# Patient Record
(demographics unavailable — no encounter records)

---

## 2024-10-08 NOTE — HISTORY OF PRESENT ILLNESS
[de-identified] : cough/sore throat [FreeTextEntry6] : Presents with c/o cough/congestion x few days -- middle of the night last night barky cough.  c/o sore throat, feels some discomfort with cough.  NO fever.  Meds given: Motrin overnight. steam.  Appetite/activity at baseline, drinking well, good UO.  No vomiting/No diarrhea.   + school/sick contacts/baseball.

## 2024-10-08 NOTE — HISTORY OF PRESENT ILLNESS
[de-identified] : cough/sore throat [FreeTextEntry6] : Presents with c/o cough/congestion x few days -- middle of the night last night barky cough.  c/o sore throat, feels some discomfort with cough.  NO fever.  Meds given: Motrin overnight. steam.  Appetite/activity at baseline, drinking well, good UO.  No vomiting/No diarrhea.   + school/sick contacts/baseball.

## 2024-10-08 NOTE — PHYSICAL EXAM
[Pale Nasal Mucosa] : pale nasal mucosa [Erythematous Oropharynx] : erythematous oropharynx [Palate petechiae] : palate petechiae [NL] : warm, clear [Lethargic] : not lethargic [Clear Rhinorrhea] : clear rhinorrhea [Inflamed Nasal Mucosa] : inflamed nasal mucosa

## 2024-10-08 NOTE — DISCUSSION/SUMMARY
[FreeTextEntry1] :  9 year y/o male with mild croup, acute URI/pharyngitis - no signs of respiratory distress on exam.  Likely viral - no indication for antibiotic use at this time.   Rapid strep neg, TCX sent will follow up with results.  Supportive care reviewed -- Zyrtec/Flonase as directed, Nasal saline PRN, humidifier Good hydration discussed & good hand hygiene reviewed  If fever develops/persists > 48hr return for re-eval. Recommend using cool mist from a humidifier. Allow the child to breathe cool air during the night by opening a window or door.  Fever can be treated with an over-the-counter medication such as acetaminophen or ibuprofen.  Coughing can be treated with warm, clear fluids to loosen mucus on the vocal cords.  Keep the child's head elevated.  May use cetirizine qHS prn congestion/runny nose.  If any stridor at rest or worsening cough go to ED or contact health care provider immediately. d/w parent when steroids are indicated - if cough worsens will start as directed x 3d.  If worsens will return for re-eval.  RED FLAGS REVIEWED - indications for ED eval discussed, signs of respiratory distress/dehydration reviewed - parent agrees with plan, demonstrates an understanding, is able to repeat back instructions and has no questions at this time.   Encouraged good hydration and normal activity & diet.  Return sooner PRN.  Well care as scheduled.

## 2024-11-13 NOTE — PHYSICAL EXAM
[Clear Rhinorrhea] : clear rhinorrhea [Erythematous Oropharynx] : erythematous oropharynx [Clear to Auscultation Bilaterally] : clear to auscultation bilaterally [NL] : warm, clear [de-identified] : + PND

## 2024-11-13 NOTE — DISCUSSION/SUMMARY
[FreeTextEntry1] :  10 yo M w/ mycoplasma on RVP.  Abx as prescribed. Can trial albuterol as well q4h PRN. CXR orderd if no improvement on abx. Continue supportive care measures. RED FLAGS REVIEWED- discussed s/s of distress/ dehydration, discussed indications for going to ED for eval.  Parent expressed understanding and was able to verbalize back instructions/advice.  Parent to call/ return to office with patient for any concerns/ worsening symptoms. Recheck in 1 week.

## 2024-11-13 NOTE — HISTORY OF PRESENT ILLNESS
[de-identified] : Cough [FreeTextEntry6] : >1 week ago w/ dry/ persistent cough No fever Decreased energy Last night taken to [facility] [place]- Pulse ox 96%--> given albuterol and dexamethasone Pulse ox overnight when asleep was 91% Family hx of asthma Cough improved

## 2024-11-13 NOTE — DISCUSSION/SUMMARY
[FreeTextEntry1] :  8 yo M w/ mycoplasma on RVP.  Abx as prescribed. Can trial albuterol as well q4h PRN. CXR orderd if no improvement on abx. Continue supportive care measures. RED FLAGS REVIEWED- discussed s/s of distress/ dehydration, discussed indications for going to ED for eval.  Parent expressed understanding and was able to verbalize back instructions/advice.  Parent to call/ return to office with patient for any concerns/ worsening symptoms. Recheck in 1 week.

## 2024-11-13 NOTE — PHYSICAL EXAM
[Clear Rhinorrhea] : clear rhinorrhea [Erythematous Oropharynx] : erythematous oropharynx [Clear to Auscultation Bilaterally] : clear to auscultation bilaterally [NL] : warm, clear [de-identified] : + PND

## 2024-11-13 NOTE — PHYSICAL EXAM
[Clear Rhinorrhea] : clear rhinorrhea [Erythematous Oropharynx] : erythematous oropharynx [Clear to Auscultation Bilaterally] : clear to auscultation bilaterally [NL] : warm, clear [de-identified] : + PND

## 2024-11-13 NOTE — HISTORY OF PRESENT ILLNESS
[de-identified] : Cough [FreeTextEntry6] : >1 week ago w/ dry/ persistent cough No fever Decreased energy Last night taken to [facility]- Pulse ox 96%--> given albuterol and dexamethasone Pulse ox overnight when asleep was 91% Family hx of asthma Cough improved

## 2024-11-15 NOTE — HISTORY OF PRESENT ILLNESS
[de-identified] : recheck cough  [FreeTextEntry6] : He went to [facility]- Pulse ox 96% with cough/shortness of breath- given albuterol and dexamethasone (11/6).  He was seen for recheck few days ago & is here today to recheck lungs after completion of Zpack.  O2 sat 97-98% at night which improved from few days ago where his sat was 91-92%.  Patient states he is feeling better. Patient and mom state that cough improved, mild congestion. NO fever.  NO c/o pain at this time.  Meds given: Albuterol PRN which helps. Completed Zithromax 5d course. No other meds at this time.  Appetite/activity back to baseline, drinking well, good UO.  No vomiting/No diarrhea.   + school/sick contacts.

## 2024-11-15 NOTE — PHYSICAL EXAM
[NL] : warm, clear [Toxic] : not toxic [Wheezing] : no wheezing [Subcostal Retractions] : no subcostal retractions [Suprasternal Retractions] : no suprasternal retractions [FreeTextEntry4] : mild congestion.

## 2024-11-15 NOTE — DISCUSSION/SUMMARY
[FreeTextEntry1] :  9 year old male seen today for follow up after completion of antibiotics for atypical pneumonia.  Did well with antibx - lung exam clear - improved O2 sat.  Supportive care reviewed -- to start Zyrtec/Flonase qHS PRN, Nasal saline PRN, cool mist humidifier, steam up bathroom.   Good hydration discussed & good hand hygiene reviewed  If fever recurs or condition worsens return for re-eval. RED FLAGS REVIEWED - indications for ED eval discussed, signs of distress/dehydration reviewed - Mom agrees with plan, demonstrates an understanding, is able to repeat back instructions and has no questions at this time.   AAP 5210 reviewed -- once feeling better may resume normal activity & diet.  Return sooner PRN.  Well care as scheduled.

## 2024-11-15 NOTE — HISTORY OF PRESENT ILLNESS
[de-identified] : recheck cough  [FreeTextEntry6] : He went to [facility] [place]- Pulse ox 96% with cough/shortness of breath- given albuterol and dexamethasone (11/6).  He was seen for recheck few days ago & is here today to recheck lungs after completion of Zpack.  O2 sat 97-98% at night which improved from few days ago where his sat was 91-92%.  Patient states he is feeling better. Patient and mom state that cough improved, mild congestion. NO fever.  NO c/o pain at this time.  Meds given: Albuterol PRN which helps. Completed Zithromax 5d course. No other meds at this time.  Appetite/activity back to baseline, drinking well, good UO.  No vomiting/No diarrhea.   + school/sick contacts.

## 2024-12-11 NOTE — DISCUSSION/SUMMARY
[FreeTextEntry1] :  9 year old male with PND, clear lungs.  d/w mom no indication for antibiotic use at this time.   Supportive care reviewed -- to start Zyrtec and Flonase qHS PRN, Nasal saline PRN, cool mist humidifier, steam up bathroom.  REVIEWED indications/doses/uses for Albuterol use.  Good hydration discussed & good hand hygiene reviewed  If fever develops > 48hr or condition worsens return for re-eval. RED FLAGS REVIEWED - indications for ED eval discussed, signs of distress/dehydration reviewed - Mom agrees with plan, demonstrates an understanding, is able to repeat back instructions and has no questions at this time.   AAP 5210 reviewed -- once feeling better may resume normal activity & diet.  Return sooner PRN.  Well care as scheduled.

## 2024-12-11 NOTE — PHYSICAL EXAM
[Clear Rhinorrhea] : clear rhinorrhea [Wheezing] : no wheezing [Subcostal Retractions] : no subcostal retractions [Suprasternal Retractions] : no suprasternal retractions [NL] : warm, clear [FreeTextEntry4] : +congestion [de-identified] : +PND

## 2024-12-11 NOTE — HISTORY OF PRESENT ILLNESS
[de-identified] : congestion  [FreeTextEntry6] : Presents with c/o cough/congestion x few days. feeling better from recent illness of atypical pneumonia which was treated with Zithromax.  Mom wanted to get him checked before [place] goes [place].  Patient states he feels well.  NO fever.  Meds given: Albuterol inhaler PRN.  Appetite/activity at baseline, drinking well, good UO.  No vomiting/No diarrhea.   + school/sick contacts. Mom sick.

## 2024-12-11 NOTE — HISTORY OF PRESENT ILLNESS
[de-identified] : congestion  [FreeTextEntry6] : Presents with c/o cough/congestion x few days. feeling better from recent illness of atypical pneumonia which was treated with Zithromax.  Mom wanted to get him checked before [place] goes [place].  Patient states he feels well.  NO fever.  Meds given: Albuterol inhaler PRN.  Appetite/activity at baseline, drinking well, good UO.  No vomiting/No diarrhea.   + school/sick contacts. Mom sick.

## 2025-01-30 NOTE — HISTORY OF PRESENT ILLNESS
[EENT/Resp Symptoms] : EENT/RESPIRATORY SYMPTOMS [Runny nose] : runny nose [Nasal congestion] : nasal congestion [Chest congestion] : chest congestion [Cough] : cough [___ Day(s)] : [unfilled] day(s) [Constant] : constant [Fatigued] : fatigued [Sick Contacts: ___] : sick contacts: [unfilled] [Clear rhinorrhea] : clear rhinorrhea [Wet cough] : wet cough [Acetaminophen] : acetaminophen [Max Temp: ____] : Max temperature: [unfilled]

## 2025-01-30 NOTE — REVIEW OF SYSTEMS
[Malaise] : malaise [Headache] : headache [Nasal Discharge] : nasal discharge [Nasal Congestion] : nasal congestion [Cough] : cough [Myalgia] : myalgia [Negative] : Genitourinary

## 2025-03-19 NOTE — HISTORY OF PRESENT ILLNESS
[de-identified] : cough  [FreeTextEntry6] : Presents with c/o cough/congestion x 2 days, felt wheezy this morning.  Felt warm but NO fever.  Denies sore throat/ear pain.  Meds given: OTC cough med. Sunday night took inhaler but not overnight/today.  Appetite/activity at baseline, drinking well, good UO.  No vomiting/No diarrhea.   + school/sick contacts.

## 2025-03-19 NOTE — HISTORY OF PRESENT ILLNESS
[de-identified] : cough  [FreeTextEntry6] : Presents with c/o cough/congestion x 2 days, felt wheezy this morning.  Felt warm but NO fever.  Denies sore throat/ear pain.  Meds given: OTC cough med. Sunday night took inhaler but not overnight/today.  Appetite/activity at baseline, drinking well, good UO.  No vomiting/No diarrhea.   + school/sick contacts. Doxycycline Counseling:  Patient counseled regarding possible photosensitivity and increased risk for sunburn.  Patient instructed to avoid sunlight, if possible.  When exposed to sunlight, patients should wear protective clothing, sunglasses, and sunscreen.  The patient was instructed to call the office immediately if the following severe adverse effects occur:  hearing changes, easy bruising/bleeding, severe headache, or vision changes.  The patient verbalized understanding of the proper use and possible adverse effects of doxycycline.  All of the patient's questions and concerns were addressed.

## 2025-03-19 NOTE — PHYSICAL EXAM
Problem: SAFETY ADULT  Goal: Patient will remain free of falls  Description: INTERVENTIONS:  - Educate patient/family on patient safety including physical limitations  - Instruct patient to call for assistance with activity   - Consult OT/PT to assist with strengthening/mobility   - Keep Call bell within reach  - Keep bed low and locked with side rails adjusted as appropriate  - Keep care items and personal belongings within reach  - Initiate and maintain comfort rounds  - Make Fall Risk Sign visible to staff  - Offer Toileting every 4 Hours, in advance of nee  - Apply yellow socks and bracelet for high fall risk patients  - Consider moving patient to room near nurses station  Outcome: Progressing      [Clear Rhinorrhea] : clear rhinorrhea [NL] : warm, clear [Toxic] : not toxic [Stridor] : no stridor [Wheezing] : wheezing [Crackles] : no crackles

## 2025-03-19 NOTE — PHYSICAL EXAM
[Clear Rhinorrhea] : clear rhinorrhea [NL] : warm, clear [Toxic] : not toxic [Stridor] : no stridor [Wheezing] : wheezing [Crackles] : no crackles

## 2025-03-19 NOTE — DISCUSSION/SUMMARY
[FreeTextEntry1] :  10 year old male with acute URI with wheezing - h/o mild intermittent asthma - likely viral - no indication for antibiotic use at this time.   Declines RVP at this time - IF S/S WORSEN OR FEVER WILL RETURN FOR SWAB.  Supportive care reviewed -- may use Fluticasone PRN, Zyrtec or claritin qHS PRN, Nasal saline PRN, cool mist humidifier, steam up bathroom.   Reviewed indication/dosing/proper techniques for Albuterol use.  Good hydration discussed & good hand hygiene reviewed  If fever develops > 48hr or condition worsens return for re-eval. RED FLAGS REVIEWED - indications for ED eval discussed, signs of distress/dehydration reviewed - parent demonstrates an understanding, is able to repeat back instructions and has no questions at this time.   AAP 5210 reviewed -- once feeling better may resume normal activity & diet.  Cardio 5/9/25.  Return sooner PRN.  Well care as scheduled.

## 2025-06-19 NOTE — PHYSICAL EXAM
[Alert] : alert [No Acute Distress] : no acute distress [Normocephalic] : normocephalic [Conjunctivae with no discharge] : conjunctivae with no discharge [EOMI Bilateral] : EOMI bilateral [Auricles Well Formed] : auricles well formed [Clear Tympanic membranes with present light reflex and bony landmarks] : clear tympanic membranes with present light reflex and bony landmarks [No Discharge] : no discharge [Nares Patent] : nares patent [Palate Intact] : palate intact [Nonerythematous Oropharynx] : nonerythematous oropharynx [Supple, full passive range of motion] : supple, full passive range of motion [No Palpable Masses] : no palpable masses [Symmetric Chest Rise] : symmetric chest rise [Clear to Auscultation Bilaterally] : clear to auscultation bilaterally [Regular Rate and Rhythm] : regular rate and rhythm [Normal S1, S2 present] : normal S1, S2 present [No Murmurs] : no murmurs [+2 Femoral Pulses] : +2 femoral pulses [Soft] : soft [NonTender] : non tender [Non Distended] : non distended [Normoactive Bowel Sounds] : normoactive bowel sounds [No Hepatomegaly] : no hepatomegaly [No Splenomegaly] : no splenomegaly [Sahil: _____] : Sahil [unfilled] [Testicles Descended Bilaterally] : testicles descended bilaterally [Patent] : patent [No Abnormal Lymph Nodes Palpated] : no abnormal lymph nodes palpated [No Gait Asymmetry] : no gait asymmetry [No pain or deformities with palpation of bone, muscles, joints] : no pain or deformities with palpation of bone, muscles, joints [Normal Muscle Tone] : normal muscle tone [Straight] : straight [Cranial Nerves Grossly Intact] : cranial nerves grossly intact [No Rash or Lesions] : no rash or lesions [FreeTextEntry6] : hair. no testicle or penile enlargement

## 2025-06-19 NOTE — DISCUSSION/SUMMARY
[Normal Growth] : growth [Normal Development] : development  [No Elimination Concerns] : elimination [Continue Regimen] : feeding [No Skin Concerns] : skin [Normal Sleep Pattern] : sleep [None] : no medical problems [Anticipatory Guidance Given] : Anticipatory guidance addressed as per the history of present illness section [Development and Mental Health] : development and mental health [Nutrition and Physical Activity] : nutrition and physical activity [Oral Health] : oral health [No Vaccines] : no vaccines needed [No Medications] : ~He/She~ is not on any medications [Patient] : patient [Mother] : mother [] : The components of the vaccine(s) to be administered today are listed in the plan of care. The disease(s) for which the vaccine(s) are intended to prevent and the risks have been discussed with the caretaker.  The risks are also included in the appropriate vaccination information statements which have been provided to the patient's caregiver.  The caregiver has given consent to vaccinate. [FreeTextEntry1] : recommended AAP book for pre-teens on puberty. counseled on weight. he is very conscious of what he eats. wants to eat healthy.   Discussed components of 5-2-1-0, healthy active living with patient and family.  Recommended 5 servings of fruits and vegetables per day, less than 2 hours of screen time per day, 1 hour of exercise per day, and ZERO sugar sweetened beverages.

## 2025-06-19 NOTE — HISTORY OF PRESENT ILLNESS
[Mother] : mother [Brushing teeth twice/d] : brushing teeth twice per day [Yes] : Patient goes to dentist yearly [Playtime (60 min/d)] : playtime 60 min a day [Grade ___] : Grade [unfilled] [Adequate performance] : adequate performance [NO] : No [Up to date] : Up to date [de-identified] : mom says he has questions about puberty changes [de-identified] : trying more fruits and vegetables [FreeTextEntry9] : plays baseball, basketball

## 2025-07-02 NOTE — HISTORY OF PRESENT ILLNESS
[de-identified] : 06/30/2025: Here for bilateral wrist Patient reports that he slipped on mud, fell into wall and injured bilateral wrist. 6/27/25.  He was seen at [facility] 6/27/25 where he underwent x-rays and was placed in brace. Images reviewed by me today. My independent interpretation of this test is bilateral nondisplaced metaphyseal distal radius fracture.  ARSEN NKDA

## 2025-07-02 NOTE — IMAGING
[de-identified] : Left wrist Skin intact NVID FAROM fingers TTP distal radius no deformity  Right wrist Skin intact NVID FAROM fingers TTP distal radius no deformity

## 2025-07-02 NOTE — ASSESSMENT
[FreeTextEntry1] : The patient was advised of the diagnosis. The natural history of the pathology was explained in full to the patient in layman's terms. All questions were answered. The risks and benefits of surgical and non-surgical treatment alternatives were explained in full to the patient.  closed treatment of bilateral distal radius fractures Bilateral  short arm fiberglass splint was fashioned and applied.  The importance of ice and elevation were discussed with the patient.  The risks were also discussed such as compartment syndrome and skin breakdown.  They were instructed to never put foreign objects down the splint.  Patients should call for increasing pain, worsening swelling, numbness, extremity discoloration, or any other concerns. We reviewed the importance of finger range of motion. We discussed that while wrist stiffness can be tolerated, finger stiffness causes grave dysfunction and is difficult to overcome once it sets in. The patient was encouraged to engage in finger range of motion exercises. A home exercise program was demonstrated and instructions given to begin immediately and to perform the exercises hourly.  RTO 4 weeks for repeat x-rays.

## 2025-07-30 NOTE — IMAGING
[de-identified] : Left wrist Skin intact NVID FAROM fingers no TTP distal radius no deformity  Right wrist Skin intact NVID FAROM fingers mild TTP distal radius no deformity  7/30/2025: bilateral wrist 3 view xray showed significant callus formation to the bilateral distal radius fractures with acceptable alignment symmetrically.

## 2025-07-30 NOTE — HISTORY OF PRESENT ILLNESS
[de-identified] :  07/30/2025: pt here for bilateral distal radius fractures. Pt has maintained EXOS braces as instructed.   06/30/2025: Here for bilateral wrist Patient reports that he slipped on mud, fell into wall and injured bilateral wrist. 6/27/25.  He was seen at [facility] 6/27/25 where he underwent x-rays and was placed in brace. Images reviewed by me today. My independent interpretation of this test is bilateral nondisplaced metaphyseal distal radius fracture.  ARSEN MASTERS

## 2025-07-30 NOTE — ASSESSMENT
[FreeTextEntry1] : The patient was advised of the diagnosis. The natural history of the pathology was explained in full to the patient in layman's terms. All questions were answered. The risks and benefits of surgical and non-surgical treatment alternatives were explained in full to the patient.  pt allowed to return to all tolerated activities in one week. allowed to d/c EXOS braces. RTO prn.